# Patient Record
Sex: FEMALE
[De-identification: names, ages, dates, MRNs, and addresses within clinical notes are randomized per-mention and may not be internally consistent; named-entity substitution may affect disease eponyms.]

---

## 2022-01-10 ENCOUNTER — RESULT REVIEW (OUTPATIENT)
Age: 77
End: 2022-01-10

## 2023-07-31 ENCOUNTER — APPOINTMENT (OUTPATIENT)
Dept: ORTHOPEDIC SURGERY | Facility: CLINIC | Age: 78
End: 2023-07-31
Payer: MEDICARE

## 2023-07-31 DIAGNOSIS — E78.00 PURE HYPERCHOLESTEROLEMIA, UNSPECIFIED: ICD-10-CM

## 2023-07-31 DIAGNOSIS — I10 ESSENTIAL (PRIMARY) HYPERTENSION: ICD-10-CM

## 2023-07-31 DIAGNOSIS — E07.9 DISORDER OF THYROID, UNSPECIFIED: ICD-10-CM

## 2023-07-31 PROCEDURE — 99204 OFFICE O/P NEW MOD 45 MIN: CPT | Mod: 25

## 2023-07-31 PROCEDURE — 20610 DRAIN/INJ JOINT/BURSA W/O US: CPT | Mod: LT

## 2023-08-01 VITALS — HEIGHT: 65 IN | WEIGHT: 151 LBS | BODY MASS INDEX: 25.16 KG/M2

## 2023-08-01 PROBLEM — I10 HYPERTENSION: Status: RESOLVED | Noted: 2023-08-01 | Resolved: 2023-08-01

## 2023-08-01 PROBLEM — E07.9 THYROID DISEASE: Status: ACTIVE | Noted: 2023-08-01

## 2023-08-01 PROBLEM — E78.00 HIGH CHOLESTEROL: Status: RESOLVED | Noted: 2023-08-01 | Resolved: 2023-08-01

## 2023-08-03 NOTE — IMAGING
[Bilateral] : knee bilaterally [All Views] : anteroposterior, lateral, skyline, and anteroposterior standing [advanced tricompartmental OA with medial compartment narrowing and varus alignment] : advanced tricompartmental OA with medial compartment narrowing and varus alignment [Advanced patellofemoral OA] : Advanced patellofemoral OA [de-identified] : Bilateral Knee Exam:                         	   General: no distress, no ligamentous laxity Skin: no significant pertinent finding Inspection:   Effusion: (min   Malalignment: (-)  Swelling: (-)  Quad atrophy: (-)  J-sign: (-) ROM:   0 - 90 degrees of flexion. Tenderness:   MJLT: (+)  MFC. (-)  LJLT: (-)  Medial patellar facet tenderness: -  Lateral patellar facet tenderness: -  Crepitus: (+  Patellar grind tenderness: (-)  Patellar tendon: (-) Stability:   Lachman: (-)  Varus/Valgus instability: (-)  Posterior drawer: (-)  Patellar translation: wnl Additional tests:   McMurrays test: (-)  Patellar apprehension: (-)  Patellar tilt: (-)  Tight lateral retinaculum: (-) Strength: 5/5 Q/H/TA/GS/EHL Neuro: In tact to light touch throughout, DTR's wnl Vascularity: Extremity warm and well perfused Gait: mildly antalgic

## 2023-08-03 NOTE — HISTORY OF PRESENT ILLNESS
[Gradual] : gradual [9] : 9 [7] : 7 [Sharp] : sharp [Constant] : constant [de-identified] : 07/31/2023: 77 year old female here for eval of BL knee OA. Has seen outside ortho for the past year and gotten csi and gel inj with mild relief. Most recently: Left knee HA 5 months ago and Right knee CSI 2 months ago. Notes increasing stiffness and pain with ambulating. Feels somewhat unstable walking without assistance.  [] : no

## 2023-08-03 NOTE — DISCUSSION/SUMMARY
[de-identified] : Discussed options. CSI Left knee today and submit for Euflexxa for Right knee. follow up to start HA inj  ----------------------------------------------------------------------------  All relevant imaging studies pertinent to today's visit, including x-rays, MRI's and/or other advanced imaging studies (CT/etc) were independently interpreted and reviewed with the patient as needed. Implications of the studies together with the patient's clinical picture were discussed to formulate a working diagnosis and management options were detailed.  The patient was advised of the diagnosis.  The natural history of the pathology was explained in full. All questions were answered.  The risks and benefits of conservative and interventional treatment alternatives were explained to the patient   -----------------------------------------------  Large joint corticosteroid injection given: Left knee  Patient indicated for injection after trial of rest, OTC medications including aspirin, Ibuprofen, Aleve etc or prescription NSAIDS, and/or exercises at home and/ or physical therapy without satisfactory response. Patient has symptoms including pain, swelling, and/or decreased mobility in the joint. The risks, benefits, and alternatives to corticosteroid injection were explained in full to the patient, including but not limited to infection, sepsis, bleeding, scarring, skin discoloration, temporary increase in pain, syncopal episode, failure to resolve symptoms, allergic reaction, symptom recurrence, and elevation of blood sugar in diabetics. Patient understood the risks. All questions were answered. After discussion of options, patient requested an injection.   Oral informed consent was obtained and sterile technique was utilized for the procedure including the preparation of the solutions used for the injection and betadine followed by alcohol prep to the injection site. Anesthesia was given with ethyl chloride sprayed topically. The injection was delivered. Patient tolerated the procedure well.   Post Procedure Instructions: Patient was advised to call if redness, pain, or fever occur and apply ice for 15 min on and 15 min off later today  Medications delivered: Kenalog 10 mg, Lidocaine: 4 cc per knee

## 2023-08-04 RX ORDER — HYALURONATE SODIUM 30 MG/2 ML
30 SYRINGE (ML) INTRAARTICULAR
Qty: 4 | Refills: 0 | Status: ACTIVE | COMMUNITY
Start: 2023-08-04 | End: 1900-01-01

## 2023-08-28 ENCOUNTER — APPOINTMENT (OUTPATIENT)
Dept: ORTHOPEDIC SURGERY | Facility: CLINIC | Age: 78
End: 2023-08-28
Payer: MEDICARE

## 2023-08-28 VITALS — BODY MASS INDEX: 25.16 KG/M2 | HEIGHT: 65 IN | WEIGHT: 151 LBS

## 2023-08-28 PROCEDURE — 20610 DRAIN/INJ JOINT/BURSA W/O US: CPT | Mod: RT

## 2023-08-28 PROCEDURE — 99024 POSTOP FOLLOW-UP VISIT: CPT

## 2023-08-28 NOTE — HISTORY OF PRESENT ILLNESS
[Gradual] : gradual [6] : 6 [Sharp] : sharp [Constant] : constant [1] : 1 [Orthovisc] : Orthovisc [de-identified] : 07/31/2023: 77 year old female here for eval of BL knee OA. Has seen outside ortho for the past year and gotten csi and gel inj with mild relief. Most recently: Left knee HA 5 months ago and Right knee CSI 2 months ago. Notes increasing stiffness and pain with ambulating. Feels somewhat unstable walking without assistance.  [] : This patient has had an injection before: no

## 2023-08-28 NOTE — IMAGING
[Bilateral] : knee bilaterally [All Views] : anteroposterior, lateral, skyline, and anteroposterior standing [advanced tricompartmental OA with medial compartment narrowing and varus alignment] : advanced tricompartmental OA with medial compartment narrowing and varus alignment [Advanced patellofemoral OA] : Advanced patellofemoral OA [de-identified] : Bilateral Knee Exam:                         	   General: no distress, no ligamentous laxity Skin: no significant pertinent finding Inspection:   Effusion: (min   Malalignment: (-)  Swelling: (-)  Quad atrophy: (-)  J-sign: (-) ROM:   0 - 90 degrees of flexion. Tenderness:   MJLT: (+)  MFC. (-)  LJLT: (-)  Medial patellar facet tenderness: -  Lateral patellar facet tenderness: -  Crepitus: (+  Patellar grind tenderness: (-)  Patellar tendon: (-) Stability:   Lachman: (-)  Varus/Valgus instability: (-)  Posterior drawer: (-)  Patellar translation: wnl Additional tests:   McMurrays test: (-)  Patellar apprehension: (-)  Patellar tilt: (-)  Tight lateral retinaculum: (-) Strength: 5/5 Q/H/TA/GS/EHL Neuro: In tact to light touch throughout, DTR's wnl Vascularity: Extremity warm and well perfused Gait: mildly antalgic

## 2023-08-28 NOTE — DISCUSSION/SUMMARY
[de-identified] : Start Euflexxa R knee #1.  fu 1 wk to continue ----------------------------------------------------------------------------  All relevant imaging studies pertinent to today's visit, including x-rays, MRI's and/or other advanced imaging studies (CT/etc) were independently interpreted and reviewed with the patient as needed. Implications of the studies together with the patient's clinical picture were discussed to formulate a working diagnosis and management options were detailed.  The patient was advised of the diagnosis.  The natural history of the pathology was explained in full. All questions were answered.  The risks and benefits of conservative and interventional treatment alternatives were explained to the patient   Large joint injection given: Hyaluronic acid/ viscosupplementation to Right knee  Viscosupplementation injection indications at this time include- X-ray evidence of osteoarthritis on this or prior visits, and patient having tried OTC's including aspirin, Ibuprofen, Aleve etc or prescription NSAIDS, and/or exercises at home and/ or physical therapy without satisfactory response.  The risks, benefits, and alternatives to viscosupplementation injection were explained in full to the patient. Risks outlined include but are not limited to infection, sepsis, bleeding, scarring, skin discoloration, temporary increase in pain, syncopal episode, failure to resolve symptoms, allergic reaction, and symptom recurrence. Patient understood the risks. All questions were answered. After discussion of options, the patient requested viscosupplementation.   An injection of Hyaluronic acid of appropriate formulation was injected into the joint(s) after verbal consent, using sterile technique. The patient tolerated the procedure well and there were no complications. Instructed patient to apply ice to the injection site. Signs and symptoms of infection reviewed and patient advised to call immediately for redness, fevers, and/or chills.

## 2023-09-05 ENCOUNTER — APPOINTMENT (OUTPATIENT)
Dept: ORTHOPEDIC SURGERY | Facility: CLINIC | Age: 78
End: 2023-09-05
Payer: MEDICARE

## 2023-09-05 VITALS — WEIGHT: 151 LBS | BODY MASS INDEX: 25.16 KG/M2 | HEIGHT: 65 IN

## 2023-09-05 PROCEDURE — 99024 POSTOP FOLLOW-UP VISIT: CPT

## 2023-09-05 PROCEDURE — 20610 DRAIN/INJ JOINT/BURSA W/O US: CPT | Mod: RT

## 2023-09-05 NOTE — IMAGING
[Bilateral] : knee bilaterally [All Views] : anteroposterior, lateral, skyline, and anteroposterior standing [advanced tricompartmental OA with medial compartment narrowing and varus alignment] : advanced tricompartmental OA with medial compartment narrowing and varus alignment [Advanced patellofemoral OA] : Advanced patellofemoral OA [de-identified] : Bilateral Knee Exam:                         	   General: no distress, no ligamentous laxity Skin: no significant pertinent finding Inspection:   Effusion: (min   Malalignment: (-)  Swelling: (-)  Quad atrophy: (-)  J-sign: (-) ROM:   0 - 90 degrees of flexion. Tenderness:   MJLT: (+)  MFC. (-)  LJLT: (-)  Medial patellar facet tenderness: -  Lateral patellar facet tenderness: -  Crepitus: (+  Patellar grind tenderness: (-)  Patellar tendon: (-) Stability:   Lachman: (-)  Varus/Valgus instability: (-)  Posterior drawer: (-)  Patellar translation: wnl Additional tests:   McMurrays test: (-)  Patellar apprehension: (-)  Patellar tilt: (-)  Tight lateral retinaculum: (-) Strength: 5/5 Q/H/TA/GS/EHL Neuro: In tact to light touch throughout, DTR's wnl Vascularity: Extremity warm and well perfused Gait: mildly antalgic

## 2023-09-05 NOTE — DISCUSSION/SUMMARY
[de-identified] : Start Orthovisc R knee #2  fu 1 wk to continue ----------------------------------------------------------------------------  All relevant imaging studies pertinent to today's visit, including x-rays, MRI's and/or other advanced imaging studies (CT/etc) were independently interpreted and reviewed with the patient as needed. Implications of the studies together with the patient's clinical picture were discussed to formulate a working diagnosis and management options were detailed.  The patient was advised of the diagnosis.  The natural history of the pathology was explained in full. All questions were answered.  The risks and benefits of conservative and interventional treatment alternatives were explained to the patient   Large joint injection given: Hyaluronic acid/ viscosupplementation to Right knee  Viscosupplementation injection indications at this time include- X-ray evidence of osteoarthritis on this or prior visits, and patient having tried OTC's including aspirin, Ibuprofen, Aleve etc or prescription NSAIDS, and/or exercises at home and/ or physical therapy without satisfactory response.  The risks, benefits, and alternatives to viscosupplementation injection were explained in full to the patient. Risks outlined include but are not limited to infection, sepsis, bleeding, scarring, skin discoloration, temporary increase in pain, syncopal episode, failure to resolve symptoms, allergic reaction, and symptom recurrence. Patient understood the risks. All questions were answered. After discussion of options, the patient requested viscosupplementation.   An injection of Hyaluronic acid of appropriate formulation was injected into the joint(s) after verbal consent, using sterile technique. The patient tolerated the procedure well and there were no complications. Instructed patient to apply ice to the injection site. Signs and symptoms of infection reviewed and patient advised to call immediately for redness, fevers, and/or chills.

## 2023-09-05 NOTE — HISTORY OF PRESENT ILLNESS
[Gradual] : gradual [6] : 6 [Sharp] : sharp [Constant] : constant [2] : 2 [Orthovisc] : Orthovisc [de-identified] : 07/31/2023: 77 year old female here for eval of BL knee OA. Has seen outside ortho for the past year and gotten csi and gel inj with mild relief. Most recently: Left knee HA 5 months ago and Right knee CSI 2 months ago. Notes increasing stiffness and pain with ambulating. Feels somewhat unstable walking without assistance.  [] : no [de-identified] : 8/28/23

## 2023-09-11 ENCOUNTER — APPOINTMENT (OUTPATIENT)
Dept: ORTHOPEDIC SURGERY | Facility: CLINIC | Age: 78
End: 2023-09-11
Payer: MEDICARE

## 2023-09-11 PROCEDURE — 20610 DRAIN/INJ JOINT/BURSA W/O US: CPT | Mod: RT

## 2023-09-11 PROCEDURE — 99024 POSTOP FOLLOW-UP VISIT: CPT

## 2023-09-19 ENCOUNTER — APPOINTMENT (OUTPATIENT)
Dept: ORTHOPEDIC SURGERY | Facility: CLINIC | Age: 78
End: 2023-09-19
Payer: MEDICARE

## 2023-09-19 PROCEDURE — 20610 DRAIN/INJ JOINT/BURSA W/O US: CPT | Mod: RT

## 2023-09-19 PROCEDURE — 99024 POSTOP FOLLOW-UP VISIT: CPT

## 2023-10-03 ENCOUNTER — APPOINTMENT (OUTPATIENT)
Dept: ORTHOPEDIC SURGERY | Facility: CLINIC | Age: 78
End: 2023-10-03
Payer: MEDICARE

## 2023-10-03 VITALS — WEIGHT: 151 LBS | BODY MASS INDEX: 25.16 KG/M2 | HEIGHT: 65 IN

## 2023-10-03 PROCEDURE — 20610 DRAIN/INJ JOINT/BURSA W/O US: CPT | Mod: LT

## 2023-10-03 PROCEDURE — 99213 OFFICE O/P EST LOW 20 MIN: CPT | Mod: 25

## 2023-10-03 PROCEDURE — 99214 OFFICE O/P EST MOD 30 MIN: CPT | Mod: 25

## 2023-10-10 ENCOUNTER — APPOINTMENT (OUTPATIENT)
Dept: ORTHOPEDIC SURGERY | Facility: CLINIC | Age: 78
End: 2023-10-10

## 2023-10-10 ENCOUNTER — APPOINTMENT (OUTPATIENT)
Dept: ORTHOPEDIC SURGERY | Facility: CLINIC | Age: 78
End: 2023-10-10
Payer: MEDICARE

## 2023-10-10 VITALS — WEIGHT: 151 LBS | BODY MASS INDEX: 25.16 KG/M2 | HEIGHT: 65 IN

## 2023-10-10 PROCEDURE — 99024 POSTOP FOLLOW-UP VISIT: CPT

## 2023-10-10 PROCEDURE — 20610 DRAIN/INJ JOINT/BURSA W/O US: CPT | Mod: LT

## 2023-10-17 ENCOUNTER — APPOINTMENT (OUTPATIENT)
Dept: ORTHOPEDIC SURGERY | Facility: CLINIC | Age: 78
End: 2023-10-17
Payer: MEDICARE

## 2023-10-17 VITALS — WEIGHT: 151 LBS | HEIGHT: 65 IN | BODY MASS INDEX: 25.16 KG/M2

## 2023-10-17 PROCEDURE — 20610 DRAIN/INJ JOINT/BURSA W/O US: CPT | Mod: LT

## 2023-10-17 PROCEDURE — 99024 POSTOP FOLLOW-UP VISIT: CPT

## 2023-10-23 ENCOUNTER — APPOINTMENT (OUTPATIENT)
Dept: ORTHOPEDIC SURGERY | Facility: CLINIC | Age: 78
End: 2023-10-23
Payer: MEDICARE

## 2023-10-23 PROCEDURE — 99024 POSTOP FOLLOW-UP VISIT: CPT

## 2023-10-23 PROCEDURE — 20610 DRAIN/INJ JOINT/BURSA W/O US: CPT | Mod: LT

## 2024-06-25 ENCOUNTER — APPOINTMENT (OUTPATIENT)
Dept: ORTHOPEDIC SURGERY | Facility: CLINIC | Age: 79
End: 2024-06-25
Payer: MEDICARE

## 2024-06-25 DIAGNOSIS — M17.12 UNILATERAL PRIMARY OSTEOARTHRITIS, LEFT KNEE: ICD-10-CM

## 2024-06-25 DIAGNOSIS — M17.11 UNILATERAL PRIMARY OSTEOARTHRITIS, RIGHT KNEE: ICD-10-CM

## 2024-06-25 PROCEDURE — 99213 OFFICE O/P EST LOW 20 MIN: CPT

## 2024-07-29 ENCOUNTER — APPOINTMENT (OUTPATIENT)
Dept: ORTHOPEDIC SURGERY | Facility: CLINIC | Age: 79
End: 2024-07-29

## 2024-07-29 DIAGNOSIS — M17.11 UNILATERAL PRIMARY OSTEOARTHRITIS, RIGHT KNEE: ICD-10-CM

## 2024-07-29 PROCEDURE — 99024 POSTOP FOLLOW-UP VISIT: CPT

## 2024-07-29 PROCEDURE — 20610 DRAIN/INJ JOINT/BURSA W/O US: CPT | Mod: LT

## 2024-07-29 NOTE — IMAGING
[Bilateral] : knee bilaterally [All Views] : anteroposterior, lateral, skyline, and anteroposterior standing [advanced tricompartmental OA with medial compartment narrowing and varus alignment] : advanced tricompartmental OA with medial compartment narrowing and varus alignment [Advanced patellofemoral OA] : Advanced patellofemoral OA [de-identified] : Bilateral Knee Exam:                         	   General: no distress, no ligamentous laxity Skin: no significant pertinent finding Inspection:   Effusion: (min   Malalignment: (-)  Swelling: (-)  Quad atrophy: (-)  J-sign: (-) ROM:   0 - 95 degrees of flexion. Tenderness:   MJLT: (+)  MFC. (-)  LJLT: (-)  Medial patellar facet tenderness: -  Lateral patellar facet tenderness: -  Crepitus: (+  Patellar grind tenderness: (-)  Patellar tendon: (-) Stability:   Lachman: (-)  Varus/Valgus instability: (-)  Posterior drawer: (-)  Patellar translation: wnl Additional tests:   McMurrays test: (-)  Patellar apprehension: (-)  Patellar tilt: (-)  Tight lateral retinaculum: (-) Strength: 5/5 Q/H/TA/GS/EHL Neuro: In tact to light touch throughout, DTR's wnl Vascularity: Extremity warm and well perfused Gait: mildly antalgic

## 2024-07-29 NOTE — REASON FOR VISIT
[FreeTextEntry2] : Bilateral knee pain. Orthovisc inj helped x 6 months.  Here to start Orthovisc L knee.

## 2024-07-29 NOTE — DISCUSSION/SUMMARY
[de-identified] : L knee Orthovisc #1 Medication injected: Orthovisc dose: 30 mg/2ml injection intraarticularly in each noted joint f/u 1 week to continue series ----------------------------------------------------------------------------  Large joint injections given: Hyaluronic acid/viscosupplementation to Left knee  Viscosupplementation injection indications at this time include- X-ray evidence of osteoarthritis on this or prior visits, and patient having tried OTC's including aspirin, Ibuprofen, Aleve etc or prescription NSAIDS, and/or exercises at home and/ or physical therapy without satisfactory response.  The risks, benefits, and alternatives to viscosupplementation injection were explained in full to the patient. Risks outlined include but are not limited to infection, sepsis, bleeding, scarring, skin discoloration, temporary increase in pain, syncopal episode, failure to resolve symptoms, allergic reaction, and symptom recurrence.  Patient understood the risks. All questions were answered. After discussion of options, the patient requested viscosupplementation.   An injection of Hyaluronic acid of appropriate formulation was injected into the joint(s) after verbal consent, using sterile technique. The patient tolerated the procedure well and there were no complications.  Instructed patient to apply ice to the injection site. Signs and symptoms of infection reviewed and patient advised to call immediately for redness, fevers, and/or chills.  ----------------------------------------------------------------------------   All relevant imaging studies pertinent to today's visit, including x-rays, MRI's and/or other advanced imaging studies (CT/etc) were independently interpreted and reviewed with the patient as needed. Implications of the studies together with the patient's clinical picture were discussed to formulate a working diagnosis and management options were detailed.   The patient and/or guardian was advised of the diagnosis.  The natural history of the pathology was explained in full. All questions were answered.  The risks and benefits of conservative and interventional treatment alternatives were explained to the patient  The patient and/or guardian was advised if any advanced diagnostic/imaging study (MRI/CT/etc) is ordered to evaluate potential pathology in the affected area(s), they should follow up in the office to review the results of the study and determine further management that may be indicated.    Progress note completed by Karan Kaplan PA-C working as a scribe for Dr Linn

## 2024-07-29 NOTE — HISTORY OF PRESENT ILLNESS
[Gradual] : gradual [5] : 5 [Sharp] : sharp [Constant] : constant [4] : 4 [Orthovisc] : Orthovisc [de-identified] : 07/31/2023: 77 year old female here for eval of BL knee OA. Has seen outside ortho for the past year and gotten csi and gel inj with mild relief. Most recently: Left knee HA 5 months ago and Right knee CSI 2 months ago. Notes increasing stiffness and pain with ambulating. Feels somewhat unstable walking without assistance.   [] : no [FreeTextEntry1] : Left knee  [de-identified] : 10/17/23 [de-identified] : Left knee

## 2024-08-06 ENCOUNTER — APPOINTMENT (OUTPATIENT)
Dept: ORTHOPEDIC SURGERY | Facility: CLINIC | Age: 79
End: 2024-08-06

## 2024-08-06 PROCEDURE — 20610 DRAIN/INJ JOINT/BURSA W/O US: CPT | Mod: LT

## 2024-08-06 PROCEDURE — 99024 POSTOP FOLLOW-UP VISIT: CPT

## 2024-08-06 NOTE — HISTORY OF PRESENT ILLNESS
[Gradual] : gradual [5] : 5 [Sharp] : sharp [Constant] : constant [2] : 2 [Orthovisc] : Orthovisc [de-identified] : 07/31/2023: 77 year old female here for eval of BL knee OA. Has seen outside ortho for the past year and gotten csi and gel inj with mild relief. Most recently: Left knee HA 5 months ago and Right knee CSI 2 months ago. Notes increasing stiffness and pain with ambulating. Feels somewhat unstable walking without assistance.   [] : no [FreeTextEntry1] : Left knee  [de-identified] : 7/29/24 [de-identified] : Left knee

## 2024-08-06 NOTE — DISCUSSION/SUMMARY
[de-identified] : L knee Orthovisc #2 Medication injected: Orthovisc dose: 30 mg/2ml injection intraarticularly in each noted joint f/u 1 week to continue series  ----------------------------------------------------------------------------  Large joint injections given: Hyaluronic acid/viscosupplementation to Left knee  Viscosupplementation injection indications at this time include- X-ray evidence of osteoarthritis on this or prior visits, and patient having tried OTC's including aspirin, Ibuprofen, Aleve etc or prescription NSAIDS, and/or exercises at home and/ or physical therapy without satisfactory response.  The risks, benefits, and alternatives to viscosupplementation injection were explained in full to the patient. Risks outlined include but are not limited to infection, sepsis, bleeding, scarring, skin discoloration, temporary increase in pain, syncopal episode, failure to resolve symptoms, allergic reaction, and symptom recurrence.  Patient understood the risks. All questions were answered. After discussion of options, the patient requested viscosupplementation.   An injection of Hyaluronic acid of appropriate formulation was injected into the joint(s) after verbal consent, using sterile technique. The patient tolerated the procedure well and there were no complications.  Instructed patient to apply ice to the injection site. Signs and symptoms of infection reviewed and patient advised to call immediately for redness, fevers, and/or chills.  ----------------------------------------------------------------------------   All relevant imaging studies pertinent to today's visit, including x-rays, MRI's and/or other advanced imaging studies (CT/etc) were independently interpreted and reviewed with the patient as needed. Implications of the studies together with the patient's clinical picture were discussed to formulate a working diagnosis and management options were detailed.   The patient and/or guardian was advised of the diagnosis.  The natural history of the pathology was explained in full. All questions were answered.  The risks and benefits of conservative and interventional treatment alternatives were explained to the patient  The patient and/or guardian was advised if any advanced diagnostic/imaging study (MRI/CT/etc) is ordered to evaluate potential pathology in the affected area(s), they should follow up in the office to review the results of the study and determine further management that may be indicated.    Progress note completed by Karan Kaplan PA-C working as a scribe for Dr Linn

## 2024-08-06 NOTE — IMAGING
[Bilateral] : knee bilaterally [All Views] : anteroposterior, lateral, skyline, and anteroposterior standing [advanced tricompartmental OA with medial compartment narrowing and varus alignment] : advanced tricompartmental OA with medial compartment narrowing and varus alignment [Advanced patellofemoral OA] : Advanced patellofemoral OA [de-identified] : Bilateral Knee Exam:                         	   General: no distress, no ligamentous laxity Skin: no significant pertinent finding Inspection:   Effusion: (min   Malalignment: (-)  Swelling: (-)  Quad atrophy: (-)  J-sign: (-) ROM:   0 - 95 degrees of flexion. Tenderness:   MJLT: (+)  MFC. (-)  LJLT: (-)  Medial patellar facet tenderness: -  Lateral patellar facet tenderness: -  Crepitus: (+  Patellar grind tenderness: (-)  Patellar tendon: (-) Stability:   Lachman: (-)  Varus/Valgus instability: (-)  Posterior drawer: (-)  Patellar translation: wnl Additional tests:   McMurrays test: (-)  Patellar apprehension: (-)  Patellar tilt: (-)  Tight lateral retinaculum: (-) Strength: 5/5 Q/H/TA/GS/EHL Neuro: In tact to light touch throughout, DTR's wnl Vascularity: Extremity warm and well perfused Gait: mildly antalgic

## 2024-08-20 ENCOUNTER — APPOINTMENT (OUTPATIENT)
Dept: ORTHOPEDIC SURGERY | Facility: CLINIC | Age: 79
End: 2024-08-20

## 2024-08-20 VITALS — HEIGHT: 65 IN | BODY MASS INDEX: 25.16 KG/M2 | WEIGHT: 151 LBS

## 2024-08-20 PROCEDURE — 99024 POSTOP FOLLOW-UP VISIT: CPT

## 2024-08-20 PROCEDURE — 20610 DRAIN/INJ JOINT/BURSA W/O US: CPT

## 2024-08-20 NOTE — HISTORY OF PRESENT ILLNESS
[Gradual] : gradual [5] : 5 [Sharp] : sharp [Constant] : constant [3] : 3 [Orthovisc] : Orthovisc [de-identified] : 07/31/2023: 77 year old female here for eval of BL knee OA. Has seen outside ortho for the past year and gotten csi and gel inj with mild relief. Most recently: Left knee HA 5 months ago and Right knee CSI 2 months ago. Notes increasing stiffness and pain with ambulating. Feels somewhat unstable walking without assistance.   [] : no [FreeTextEntry1] : Left knee  [de-identified] : 8/6/24 [de-identified] : Left knee

## 2024-08-20 NOTE — DISCUSSION/SUMMARY
[de-identified] : L knee Orthovisc #3 Medication injected: Orthovisc dose: 30 mg/2ml injection intraarticularly in each noted joint f/u 1 week to continue series  ----------------------------------------------------------------------------  Large joint injections given: Hyaluronic acid/viscosupplementation to Left knee  Viscosupplementation injection indications at this time include- X-ray evidence of osteoarthritis on this or prior visits, and patient having tried OTC's including aspirin, Ibuprofen, Aleve etc or prescription NSAIDS, and/or exercises at home and/ or physical therapy without satisfactory response.  The risks, benefits, and alternatives to viscosupplementation injection were explained in full to the patient. Risks outlined include but are not limited to infection, sepsis, bleeding, scarring, skin discoloration, temporary increase in pain, syncopal episode, failure to resolve symptoms, allergic reaction, and symptom recurrence.  Patient understood the risks. All questions were answered. After discussion of options, the patient requested viscosupplementation.   An injection of Hyaluronic acid of appropriate formulation was injected into the joint(s) after verbal consent, using sterile technique. The patient tolerated the procedure well and there were no complications.  Instructed patient to apply ice to the injection site. Signs and symptoms of infection reviewed and patient advised to call immediately for redness, fevers, and/or chills.  ----------------------------------------------------------------------------   All relevant imaging studies pertinent to today's visit, including x-rays, MRI's and/or other advanced imaging studies (CT/etc) were independently interpreted and reviewed with the patient as needed. Implications of the studies together with the patient's clinical picture were discussed to formulate a working diagnosis and management options were detailed.   The patient and/or guardian was advised of the diagnosis.  The natural history of the pathology was explained in full. All questions were answered.  The risks and benefits of conservative and interventional treatment alternatives were explained to the patient  The patient and/or guardian was advised if any advanced diagnostic/imaging study (MRI/CT/etc) is ordered to evaluate potential pathology in the affected area(s), they should follow up in the office to review the results of the study and determine further management that may be indicated.

## 2024-08-20 NOTE — IMAGING
[Bilateral] : knee bilaterally [All Views] : anteroposterior, lateral, skyline, and anteroposterior standing [advanced tricompartmental OA with medial compartment narrowing and varus alignment] : advanced tricompartmental OA with medial compartment narrowing and varus alignment [Advanced patellofemoral OA] : Advanced patellofemoral OA [de-identified] : Bilateral Knee Exam:                         	   General: no distress, no ligamentous laxity Skin: no significant pertinent finding Inspection:   Effusion: (min   Malalignment: (-)  Swelling: (-)  Quad atrophy: (-)  J-sign: (-) ROM:   0 - 95 degrees of flexion. Tenderness:   MJLT: (+)  MFC. (-)  LJLT: (-)  Medial patellar facet tenderness: -  Lateral patellar facet tenderness: -  Crepitus: (+  Patellar grind tenderness: (-)  Patellar tendon: (-) Stability:   Lachman: (-)  Varus/Valgus instability: (-)  Posterior drawer: (-)  Patellar translation: wnl Additional tests:   McMurrays test: (-)  Patellar apprehension: (-)  Patellar tilt: (-)  Tight lateral retinaculum: (-) Strength: 5/5 Q/H/TA/GS/EHL Neuro: In tact to light touch throughout, DTR's wnl Vascularity: Extremity warm and well perfused Gait: mildly antalgic

## 2024-08-27 ENCOUNTER — APPOINTMENT (OUTPATIENT)
Dept: ORTHOPEDIC SURGERY | Facility: CLINIC | Age: 79
End: 2024-08-27
Payer: MEDICARE

## 2024-08-27 VITALS — HEIGHT: 65 IN | BODY MASS INDEX: 25.16 KG/M2 | WEIGHT: 151 LBS

## 2024-08-27 DIAGNOSIS — M17.12 UNILATERAL PRIMARY OSTEOARTHRITIS, LEFT KNEE: ICD-10-CM

## 2024-08-27 PROCEDURE — 20610 DRAIN/INJ JOINT/BURSA W/O US: CPT | Mod: LT

## 2024-08-27 PROCEDURE — 99024 POSTOP FOLLOW-UP VISIT: CPT

## 2024-08-27 NOTE — DISCUSSION/SUMMARY
[de-identified] : L knee Orthovisc #4 Medication injected: Orthovisc dose: 30 mg/2ml injection intraarticularly in each noted joint f/u 1 week to start R knee  ----------------------------------------------------------------------------  Large joint injections given: Hyaluronic acid/viscosupplementation to Left knee  Viscosupplementation injection indications at this time include- X-ray evidence of osteoarthritis on this or prior visits, and patient having tried OTC's including aspirin, Ibuprofen, Aleve etc or prescription NSAIDS, and/or exercises at home and/ or physical therapy without satisfactory response.  The risks, benefits, and alternatives to viscosupplementation injection were explained in full to the patient. Risks outlined include but are not limited to infection, sepsis, bleeding, scarring, skin discoloration, temporary increase in pain, syncopal episode, failure to resolve symptoms, allergic reaction, and symptom recurrence.  Patient understood the risks. All questions were answered. After discussion of options, the patient requested viscosupplementation.   An injection of Hyaluronic acid of appropriate formulation was injected into the joint(s) after verbal consent, using sterile technique. The patient tolerated the procedure well and there were no complications.  Instructed patient to apply ice to the injection site. Signs and symptoms of infection reviewed and patient advised to call immediately for redness, fevers, and/or chills.  ----------------------------------------------------------------------------   All relevant imaging studies pertinent to today's visit, including x-rays, MRI's and/or other advanced imaging studies (CT/etc) were independently interpreted and reviewed with the patient as needed. Implications of the studies together with the patient's clinical picture were discussed to formulate a working diagnosis and management options were detailed.   The patient and/or guardian was advised of the diagnosis.  The natural history of the pathology was explained in full. All questions were answered.  The risks and benefits of conservative and interventional treatment alternatives were explained to the patient  The patient and/or guardian was advised if any advanced diagnostic/imaging study (MRI/CT/etc) is ordered to evaluate potential pathology in the affected area(s), they should follow up in the office to review the results of the study and determine further management that may be indicated.

## 2024-08-27 NOTE — HISTORY OF PRESENT ILLNESS
[Gradual] : gradual [5] : 5 [Sharp] : sharp [Constant] : constant [4] : 4 [Orthovisc] : Orthovisc [de-identified] : 07/31/2023: 77 year old female here for eval of BL knee OA. Has seen outside ortho for the past year and gotten csi and gel inj with mild relief. Most recently: Left knee HA 5 months ago and Right knee CSI 2 months ago. Notes increasing stiffness and pain with ambulating. Feels somewhat unstable walking without assistance.   [] : no [FreeTextEntry1] : Left knee  [de-identified] : 8/20/24 [de-identified] : Left knee

## 2024-08-27 NOTE — IMAGING
[Bilateral] : knee bilaterally [All Views] : anteroposterior, lateral, skyline, and anteroposterior standing [advanced tricompartmental OA with medial compartment narrowing and varus alignment] : advanced tricompartmental OA with medial compartment narrowing and varus alignment [Advanced patellofemoral OA] : Advanced patellofemoral OA [de-identified] : Bilateral Knee Exam:                         	   General: no distress, no ligamentous laxity Skin: no significant pertinent finding Inspection:   Effusion: (min   Malalignment: (-)  Swelling: (-)  Quad atrophy: (-)  J-sign: (-) ROM:   0 - 95 degrees of flexion. Tenderness:   MJLT: (+)  MFC. (-)  LJLT: (-)  Medial patellar facet tenderness: -  Lateral patellar facet tenderness: -  Crepitus: (+  Patellar grind tenderness: (-)  Patellar tendon: (-) Stability:   Lachman: (-)  Varus/Valgus instability: (-)  Posterior drawer: (-)  Patellar translation: wnl Additional tests:   McMurrays test: (-)  Patellar apprehension: (-)  Patellar tilt: (-)  Tight lateral retinaculum: (-) Strength: 5/5 Q/H/TA/GS/EHL Neuro: In tact to light touch throughout, DTR's wnl Vascularity: Extremity warm and well perfused Gait: mildly antalgic

## 2024-09-03 ENCOUNTER — APPOINTMENT (OUTPATIENT)
Dept: ORTHOPEDIC SURGERY | Facility: CLINIC | Age: 79
End: 2024-09-03
Payer: MEDICARE

## 2024-09-03 VITALS — HEIGHT: 65 IN | BODY MASS INDEX: 25.16 KG/M2 | WEIGHT: 151 LBS

## 2024-09-03 PROCEDURE — 20610 DRAIN/INJ JOINT/BURSA W/O US: CPT

## 2024-09-03 PROCEDURE — 99024 POSTOP FOLLOW-UP VISIT: CPT

## 2024-09-03 NOTE — DISCUSSION/SUMMARY
[de-identified] : R knee Orthovisc #1 Medication injected: Orthovisc dose: 30 mg/2ml injection intraarticularly in each noted joint f/u 1 week to continue  ----------------------------------------------------------------------------  Large joint injections given: Hyaluronic acid/viscosupplementation to Right knee  Viscosupplementation injection indications at this time include- X-ray evidence of osteoarthritis on this or prior visits, and patient having tried OTC's including aspirin, Ibuprofen, Aleve etc or prescription NSAIDS, and/or exercises at home and/ or physical therapy without satisfactory response.  The risks, benefits, and alternatives to viscosupplementation injection were explained in full to the patient. Risks outlined include but are not limited to infection, sepsis, bleeding, scarring, skin discoloration, temporary increase in pain, syncopal episode, failure to resolve symptoms, allergic reaction, and symptom recurrence.  Patient understood the risks. All questions were answered. After discussion of options, the patient requested viscosupplementation.   An injection of Hyaluronic acid of appropriate formulation was injected into the joint(s) after verbal consent, using sterile technique. The patient tolerated the procedure well and there were no complications.  Instructed patient to apply ice to the injection site. Signs and symptoms of infection reviewed and patient advised to call immediately for redness, fevers, and/or chills.  ----------------------------------------------------------------------------   All relevant imaging studies pertinent to today's visit, including x-rays, MRI's and/or other advanced imaging studies (CT/etc) were independently interpreted and reviewed with the patient as needed. Implications of the studies together with the patient's clinical picture were discussed to formulate a working diagnosis and management options were detailed.   The patient and/or guardian was advised of the diagnosis.  The natural history of the pathology was explained in full. All questions were answered.  The risks and benefits of conservative and interventional treatment alternatives were explained to the patient  The patient and/or guardian was advised if any advanced diagnostic/imaging study (MRI/CT/etc) is ordered to evaluate potential pathology in the affected area(s), they should follow up in the office to review the results of the study and determine further management that may be indicated.

## 2024-09-03 NOTE — HISTORY OF PRESENT ILLNESS
[Gradual] : gradual [5] : 5 [Sharp] : sharp [Constant] : constant [1] : 1 [Orthovisc] : Orthovisc [de-identified] : 07/31/2023: 77 year old female here for eval of BL knee OA. Has seen outside ortho for the past year and gotten csi and gel inj with mild relief. Most recently: Left knee HA 5 months ago and Right knee CSI 2 months ago. Notes increasing stiffness and pain with ambulating. Feels somewhat unstable walking without assistance.   [] : This patient has had an injection before: no [FreeTextEntry1] : Left knee  [de-identified] : Left knee

## 2024-09-03 NOTE — IMAGING
[Bilateral] : knee bilaterally [All Views] : anteroposterior, lateral, skyline, and anteroposterior standing [advanced tricompartmental OA with medial compartment narrowing and varus alignment] : advanced tricompartmental OA with medial compartment narrowing and varus alignment [Advanced patellofemoral OA] : Advanced patellofemoral OA [de-identified] : Bilateral Knee Exam:                         	   General: no distress, no ligamentous laxity Skin: no significant pertinent finding Inspection:   Effusion: (min   Malalignment: (-)  Swelling: (-)  Quad atrophy: (-)  J-sign: (-) ROM:   0 - 95 degrees of flexion. Tenderness:   MJLT: (+)  MFC. (-)  LJLT: (-)  Medial patellar facet tenderness: -  Lateral patellar facet tenderness: -  Crepitus: (+  Patellar grind tenderness: (-)  Patellar tendon: (-) Stability:   Lachman: (-)  Varus/Valgus instability: (-)  Posterior drawer: (-)  Patellar translation: wnl Additional tests:   McMurrays test: (-)  Patellar apprehension: (-)  Patellar tilt: (-)  Tight lateral retinaculum: (-) Strength: 5/5 Q/H/TA/GS/EHL Neuro: In tact to light touch throughout, DTR's wnl Vascularity: Extremity warm and well perfused Gait: mildly antalgic

## 2024-09-12 ENCOUNTER — APPOINTMENT (OUTPATIENT)
Dept: ORTHOPEDIC SURGERY | Facility: CLINIC | Age: 79
End: 2024-09-12
Payer: MEDICARE

## 2024-09-12 VITALS — BODY MASS INDEX: 25.16 KG/M2 | HEIGHT: 65 IN | WEIGHT: 151 LBS

## 2024-09-12 DIAGNOSIS — M17.11 UNILATERAL PRIMARY OSTEOARTHRITIS, RIGHT KNEE: ICD-10-CM

## 2024-09-12 PROCEDURE — 20610 DRAIN/INJ JOINT/BURSA W/O US: CPT | Mod: RT

## 2024-09-12 PROCEDURE — 99024 POSTOP FOLLOW-UP VISIT: CPT

## 2024-09-12 NOTE — DISCUSSION/SUMMARY
[Medication Risks Reviewed] : Medication risks reviewed [de-identified] : R knee Orthovisc #2 Medication injected: Orthovisc dose: 30 mg/2ml injection intraarticularly in each noted joint f/u 1 week to continue  ----------------------------------------------------------------------------  Large joint injections given: Hyaluronic acid/viscosupplementation to Right knee  Viscosupplementation injection indications at this time include- X-ray evidence of osteoarthritis on this or prior visits, and patient having tried OTC's including aspirin, Ibuprofen, Aleve etc or prescription NSAIDS, and/or exercises at home and/ or physical therapy without satisfactory response.  The risks, benefits, and alternatives to viscosupplementation injection were explained in full to the patient. Risks outlined include but are not limited to infection, sepsis, bleeding, scarring, skin discoloration, temporary increase in pain, syncopal episode, failure to resolve symptoms, allergic reaction, and symptom recurrence.  Patient understood the risks. All questions were answered. After discussion of options, the patient requested viscosupplementation.   An injection of Hyaluronic acid of appropriate formulation was injected into the joint(s) after verbal consent, using sterile technique. The patient tolerated the procedure well and there were no complications.  Instructed patient to apply ice to the injection site. Signs and symptoms of infection reviewed and patient advised to call immediately for redness, fevers, and/or chills.  ----------------------------------------------------------------------------   All relevant imaging studies pertinent to today's visit, including x-rays, MRI's and/or other advanced imaging studies (CT/etc) were independently interpreted and reviewed with the patient as needed. Implications of the studies together with the patient's clinical picture were discussed to formulate a working diagnosis and management options were detailed.   The patient and/or guardian was advised of the diagnosis.  The natural history of the pathology was explained in full. All questions were answered.  The risks and benefits of conservative and interventional treatment alternatives were explained to the patient  The patient and/or guardian was advised if any advanced diagnostic/imaging study (MRI/CT/etc) is ordered to evaluate potential pathology in the affected area(s), they should follow up in the office to review the results of the study and determine further management that may be indicated.

## 2024-09-12 NOTE — IMAGING
[Bilateral] : knee bilaterally [All Views] : anteroposterior, lateral, skyline, and anteroposterior standing [advanced tricompartmental OA with medial compartment narrowing and varus alignment] : advanced tricompartmental OA with medial compartment narrowing and varus alignment [Advanced patellofemoral OA] : Advanced patellofemoral OA [de-identified] : Bilateral Knee Exam:                         	   General: no distress, no ligamentous laxity Skin: no significant pertinent finding Inspection:   Effusion: (min   Malalignment: (-)  Swelling: (-)  Quad atrophy: (-)  J-sign: (-) ROM:   0 - 95 degrees of flexion. Tenderness:   MJLT: (+)  MFC. (-)  LJLT: (-)  Medial patellar facet tenderness: -  Lateral patellar facet tenderness: -  Crepitus: (+  Patellar grind tenderness: (-)  Patellar tendon: (-) Stability:   Lachman: (-)  Varus/Valgus instability: (-)  Posterior drawer: (-)  Patellar translation: wnl Additional tests:   McMurrays test: (-)  Patellar apprehension: (-)  Patellar tilt: (-)  Tight lateral retinaculum: (-) Strength: 5/5 Q/H/TA/GS/EHL Neuro: In tact to light touch throughout, DTR's wnl Vascularity: Extremity warm and well perfused Gait: mildly antalgic

## 2024-09-12 NOTE — HISTORY OF PRESENT ILLNESS
[Gradual] : gradual [5] : 5 [Sharp] : sharp [Constant] : constant [2] : 2 [Orthovisc] : Orthovisc [de-identified] : 07/31/2023: 77 year old female here for eval of BL knee OA. Has seen outside ortho for the past year and gotten csi and gel inj with mild relief. Most recently: Left knee HA 5 months ago and Right knee CSI 2 months ago. Notes increasing stiffness and pain with ambulating. Feels somewhat unstable walking without assistance.   [] : no [FreeTextEntry1] : Left knee  [de-identified] : 9/3/24 [de-identified] : Left knee

## 2024-09-24 ENCOUNTER — APPOINTMENT (OUTPATIENT)
Dept: ORTHOPEDIC SURGERY | Facility: CLINIC | Age: 79
End: 2024-09-24
Payer: MEDICARE

## 2024-09-24 VITALS — BODY MASS INDEX: 25.16 KG/M2 | WEIGHT: 151 LBS | HEIGHT: 65 IN

## 2024-09-24 PROCEDURE — 20610 DRAIN/INJ JOINT/BURSA W/O US: CPT | Mod: RT

## 2024-09-24 PROCEDURE — 99024 POSTOP FOLLOW-UP VISIT: CPT

## 2024-09-24 NOTE — HISTORY OF PRESENT ILLNESS
[Gradual] : gradual [5] : 5 [Sharp] : sharp [Constant] : constant [3] : 3 [Orthovisc] : Orthovisc [de-identified] : 07/31/2023: 77 year old female here for eval of BL knee OA. Has seen outside ortho for the past year and gotten csi and gel inj with mild relief. Most recently: Left knee HA 5 months ago and Right knee CSI 2 months ago. Notes increasing stiffness and pain with ambulating. Feels somewhat unstable walking without assistance.   [] : no [FreeTextEntry1] : Left knee  [de-identified] : 912/24 [de-identified] : Left knee

## 2024-09-24 NOTE — IMAGING
[Bilateral] : knee bilaterally [All Views] : anteroposterior, lateral, skyline, and anteroposterior standing [advanced tricompartmental OA with medial compartment narrowing and varus alignment] : advanced tricompartmental OA with medial compartment narrowing and varus alignment [Advanced patellofemoral OA] : Advanced patellofemoral OA [de-identified] : Bilateral Knee Exam:                         	   General: no distress, no ligamentous laxity Skin: no significant pertinent finding Inspection:   Effusion: (min   Malalignment: (-)  Swelling: (-)  Quad atrophy: (-)  J-sign: (-) ROM:   0 - 95 degrees of flexion. Tenderness:   MJLT: (+)  MFC. (-)  LJLT: (-)  Medial patellar facet tenderness: -  Lateral patellar facet tenderness: -  Crepitus: (+  Patellar grind tenderness: (-)  Patellar tendon: (-) Stability:   Lachman: (-)  Varus/Valgus instability: (-)  Posterior drawer: (-)  Patellar translation: wnl Additional tests:   McMurrays test: (-)  Patellar apprehension: (-)  Patellar tilt: (-)  Tight lateral retinaculum: (-) Strength: 5/5 Q/H/TA/GS/EHL Neuro: In tact to light touch throughout, DTR's wnl Vascularity: Extremity warm and well perfused Gait: mildly antalgic

## 2024-09-24 NOTE — DISCUSSION/SUMMARY
[Medication Risks Reviewed] : Medication risks reviewed [de-identified] : R knee Orthovisc #3 Medication injected: Orthovisc dose: 30 mg/2ml injection intraarticularly in each noted joint f/u 1 week to continue  ----------------------------------------------------------------------------  Large joint injections given: Hyaluronic acid/viscosupplementation to Right knee  Viscosupplementation injection indications at this time include- X-ray evidence of osteoarthritis on this or prior visits, and patient having tried OTC's including aspirin, Ibuprofen, Aleve etc or prescription NSAIDS, and/or exercises at home and/ or physical therapy without satisfactory response.  The risks, benefits, and alternatives to viscosupplementation injection were explained in full to the patient. Risks outlined include but are not limited to infection, sepsis, bleeding, scarring, skin discoloration, temporary increase in pain, syncopal episode, failure to resolve symptoms, allergic reaction, and symptom recurrence.  Patient understood the risks. All questions were answered. After discussion of options, the patient requested viscosupplementation.   An injection of Hyaluronic acid of appropriate formulation was injected into the joint(s) after verbal consent, using sterile technique. The patient tolerated the procedure well and there were no complications.  Instructed patient to apply ice to the injection site. Signs and symptoms of infection reviewed and patient advised to call immediately for redness, fevers, and/or chills.  ----------------------------------------------------------------------------   All relevant imaging studies pertinent to today's visit, including x-rays, MRI's and/or other advanced imaging studies (CT/etc) were independently interpreted and reviewed with the patient as needed. Implications of the studies together with the patient's clinical picture were discussed to formulate a working diagnosis and management options were detailed.   The patient and/or guardian was advised of the diagnosis.  The natural history of the pathology was explained in full. All questions were answered.  The risks and benefits of conservative and interventional treatment alternatives were explained to the patient  The patient and/or guardian was advised if any advanced diagnostic/imaging study (MRI/CT/etc) is ordered to evaluate potential pathology in the affected area(s), they should follow up in the office to review the results of the study and determine further management that may be indicated.

## 2024-10-01 ENCOUNTER — APPOINTMENT (OUTPATIENT)
Dept: ORTHOPEDIC SURGERY | Facility: CLINIC | Age: 79
End: 2024-10-01

## 2024-10-01 DIAGNOSIS — M17.11 UNILATERAL PRIMARY OSTEOARTHRITIS, RIGHT KNEE: ICD-10-CM

## 2024-10-01 DIAGNOSIS — M17.12 UNILATERAL PRIMARY OSTEOARTHRITIS, LEFT KNEE: ICD-10-CM

## 2024-10-01 PROCEDURE — 20610 DRAIN/INJ JOINT/BURSA W/O US: CPT | Mod: RT

## 2024-10-01 PROCEDURE — 99024 POSTOP FOLLOW-UP VISIT: CPT

## 2024-10-01 NOTE — HISTORY OF PRESENT ILLNESS
[de-identified] : 07/31/2023: 77 year old female here for eval of BL knee OA. Has seen outside ortho for the past year and gotten csi and gel inj with mild relief. Most recently: Left knee HA 5 months ago and Right knee CSI 2 months ago. Notes increasing stiffness and pain with ambulating. Feels somewhat unstable walking without assistance.   [Gradual] : gradual [5] : 5 [Sharp] : sharp [Constant] : constant [3] : 3 [Orthovisc] : Orthovisc [] : yes [FreeTextEntry1] : Left knee  [de-identified] : 912/24 [de-identified] : Left knee

## 2024-10-01 NOTE — IMAGING
[de-identified] : Bilateral Knee Exam:                         	   General: no distress, no ligamentous laxity Skin: no significant pertinent finding Inspection:   Effusion: (min   Malalignment: (-)  Swelling: (-)  Quad atrophy: (-)  J-sign: (-) ROM:   0 - 95 degrees of flexion. Tenderness:   MJLT: (+)  MFC. (-)  LJLT: (-)  Medial patellar facet tenderness: -  Lateral patellar facet tenderness: -  Crepitus: (+  Patellar grind tenderness: (-)  Patellar tendon: (-) Stability:   Lachman: (-)  Varus/Valgus instability: (-)  Posterior drawer: (-)  Patellar translation: wnl Additional tests:   McMurrays test: (-)  Patellar apprehension: (-)  Patellar tilt: (-)  Tight lateral retinaculum: (-) Strength: 5/5 Q/H/TA/GS/EHL Neuro: In tact to light touch throughout, DTR's wnl Vascularity: Extremity warm and well perfused Gait: mildly antalgic    [Bilateral] : knee bilaterally [All Views] : anteroposterior, lateral, skyline, and anteroposterior standing [advanced tricompartmental OA with medial compartment narrowing and varus alignment] : advanced tricompartmental OA with medial compartment narrowing and varus alignment [Advanced patellofemoral OA] : Advanced patellofemoral OA

## 2024-10-01 NOTE — DISCUSSION/SUMMARY
[Medication Risks Reviewed] : Medication risks reviewed [de-identified] : R knee Orthovisc #4 Medication injected: Orthovisc dose: 30 mg/2ml injection intraarticularly in each noted joint f/u 1 week to continue  ----------------------------------------------------------------------------  Large joint injections given: Hyaluronic acid/viscosupplementation to Right knee  Viscosupplementation injection indications at this time include- X-ray evidence of osteoarthritis on this or prior visits, and patient having tried OTC's including aspirin, Ibuprofen, Aleve etc or prescription NSAIDS, and/or exercises at home and/ or physical therapy without satisfactory response.  The risks, benefits, and alternatives to viscosupplementation injection were explained in full to the patient. Risks outlined include but are not limited to infection, sepsis, bleeding, scarring, skin discoloration, temporary increase in pain, syncopal episode, failure to resolve symptoms, allergic reaction, and symptom recurrence.  Patient understood the risks. All questions were answered. After discussion of options, the patient requested viscosupplementation.   An injection of Hyaluronic acid of appropriate formulation was injected into the joint(s) after verbal consent, using sterile technique. The patient tolerated the procedure well and there were no complications.  Instructed patient to apply ice to the injection site. Signs and symptoms of infection reviewed and patient advised to call immediately for redness, fevers, and/or chills.  ----------------------------------------------------------------------------   All relevant imaging studies pertinent to today's visit, including x-rays, MRI's and/or other advanced imaging studies (CT/etc) were independently interpreted and reviewed with the patient as needed. Implications of the studies together with the patient's clinical picture were discussed to formulate a working diagnosis and management options were detailed.   The patient and/or guardian was advised of the diagnosis.  The natural history of the pathology was explained in full. All questions were answered.  The risks and benefits of conservative and interventional treatment alternatives were explained to the patient  The patient and/or guardian was advised if any advanced diagnostic/imaging study (MRI/CT/etc) is ordered to evaluate potential pathology in the affected area(s), they should follow up in the office to review the results of the study and determine further management that may be indicated.

## 2025-06-12 ENCOUNTER — APPOINTMENT (OUTPATIENT)
Dept: ORTHOPEDIC SURGERY | Facility: CLINIC | Age: 80
End: 2025-06-12

## 2025-06-12 PROBLEM — M51.9 LUMBAR DISC DISEASE: Status: ACTIVE | Noted: 2025-06-12

## 2025-06-12 PROBLEM — M41.50 DEGENERATIVE SCOLIOSIS: Status: ACTIVE | Noted: 2025-06-12

## 2025-06-12 PROBLEM — M16.11 PRIMARY OSTEOARTHRITIS OF RIGHT HIP: Status: ACTIVE | Noted: 2025-06-12

## 2025-06-12 PROBLEM — M16.12 PRIMARY OSTEOARTHRITIS OF LEFT HIP: Status: ACTIVE | Noted: 2025-06-12

## 2025-06-12 PROCEDURE — 72170 X-RAY EXAM OF PELVIS: CPT

## 2025-06-12 PROCEDURE — 99214 OFFICE O/P EST MOD 30 MIN: CPT

## 2025-06-12 PROCEDURE — 72100 X-RAY EXAM L-S SPINE 2/3 VWS: CPT

## 2025-07-14 ENCOUNTER — APPOINTMENT (OUTPATIENT)
Dept: ORTHOPEDIC SURGERY | Facility: CLINIC | Age: 80
End: 2025-07-14
Payer: MEDICARE

## 2025-07-14 PROCEDURE — 99212 OFFICE O/P EST SF 10 MIN: CPT | Mod: 25

## 2025-07-14 PROCEDURE — 20610 DRAIN/INJ JOINT/BURSA W/O US: CPT | Mod: 50

## 2025-07-29 ENCOUNTER — APPOINTMENT (OUTPATIENT)
Dept: ORTHOPEDIC SURGERY | Facility: CLINIC | Age: 80
End: 2025-07-29
Payer: MEDICARE

## 2025-07-29 PROCEDURE — 20610 DRAIN/INJ JOINT/BURSA W/O US: CPT | Mod: 50

## 2025-08-07 ENCOUNTER — APPOINTMENT (OUTPATIENT)
Dept: ORTHOPEDIC SURGERY | Facility: CLINIC | Age: 80
End: 2025-08-07
Payer: MEDICARE

## 2025-08-07 DIAGNOSIS — M17.11 UNILATERAL PRIMARY OSTEOARTHRITIS, RIGHT KNEE: ICD-10-CM

## 2025-08-07 DIAGNOSIS — M17.12 UNILATERAL PRIMARY OSTEOARTHRITIS, LEFT KNEE: ICD-10-CM

## 2025-08-07 PROCEDURE — 99212 OFFICE O/P EST SF 10 MIN: CPT | Mod: 25

## 2025-08-07 PROCEDURE — 20610 DRAIN/INJ JOINT/BURSA W/O US: CPT | Mod: 50
